# Patient Record
Sex: FEMALE | Race: BLACK OR AFRICAN AMERICAN | NOT HISPANIC OR LATINO | Employment: OTHER | URBAN - METROPOLITAN AREA
[De-identification: names, ages, dates, MRNs, and addresses within clinical notes are randomized per-mention and may not be internally consistent; named-entity substitution may affect disease eponyms.]

---

## 2022-01-05 ENCOUNTER — OFFICE VISIT (OUTPATIENT)
Dept: FAMILY MEDICINE | Facility: CLINIC | Age: 72
End: 2022-01-05
Payer: MEDICARE

## 2022-01-05 VITALS
HEIGHT: 59 IN | BODY MASS INDEX: 23.95 KG/M2 | HEART RATE: 95 BPM | OXYGEN SATURATION: 95 % | WEIGHT: 118.8 LBS | DIASTOLIC BLOOD PRESSURE: 89 MMHG | SYSTOLIC BLOOD PRESSURE: 167 MMHG

## 2022-01-05 DIAGNOSIS — E11.9 TYPE 2 DIABETES MELLITUS WITHOUT COMPLICATION, WITHOUT LONG-TERM CURRENT USE OF INSULIN (H): ICD-10-CM

## 2022-01-05 DIAGNOSIS — M54.2 ANTERIOR NECK PAIN: Primary | ICD-10-CM

## 2022-01-05 DIAGNOSIS — U07.1 ACUTE HYPOXEMIC RESPIRATORY FAILURE DUE TO COVID-19 (H): ICD-10-CM

## 2022-01-05 DIAGNOSIS — I10 ESSENTIAL HYPERTENSION, BENIGN: ICD-10-CM

## 2022-01-05 DIAGNOSIS — J96.01 ACUTE HYPOXEMIC RESPIRATORY FAILURE DUE TO COVID-19 (H): ICD-10-CM

## 2022-01-05 LAB
ANION GAP SERPL CALCULATED.3IONS-SCNC: 10 MMOL/L (ref 5–18)
BUN SERPL-MCNC: 11 MG/DL (ref 8–28)
CALCIUM SERPL-MCNC: 10.4 MG/DL (ref 8.5–10.5)
CHLORIDE BLD-SCNC: 107 MMOL/L (ref 98–107)
CO2 SERPL-SCNC: 23 MMOL/L (ref 22–31)
CREAT SERPL-MCNC: 0.89 MG/DL (ref 0.6–1.1)
GFR SERPL CREATININE-BSD FRML MDRD: 69 ML/MIN/1.73M2
GLUCOSE BLD-MCNC: 177 MG/DL (ref 70–125)
POTASSIUM BLD-SCNC: 4.7 MMOL/L (ref 3.5–5)
SODIUM SERPL-SCNC: 140 MMOL/L (ref 136–145)
TSH SERPL DL<=0.005 MIU/L-ACNC: 0.96 UIU/ML (ref 0.3–5)

## 2022-01-05 PROCEDURE — 84443 ASSAY THYROID STIM HORMONE: CPT | Performed by: FAMILY MEDICINE

## 2022-01-05 PROCEDURE — 80048 BASIC METABOLIC PNL TOTAL CA: CPT | Performed by: FAMILY MEDICINE

## 2022-01-05 PROCEDURE — 36415 COLL VENOUS BLD VENIPUNCTURE: CPT | Performed by: FAMILY MEDICINE

## 2022-01-05 PROCEDURE — 99205 OFFICE O/P NEW HI 60 MIN: CPT | Performed by: FAMILY MEDICINE

## 2022-01-05 RX ORDER — AMLODIPINE BESYLATE 5 MG/1
1 TABLET ORAL DAILY
COMMUNITY
Start: 2021-12-17 | End: 2023-08-09

## 2022-01-05 RX ORDER — BENZONATATE 100 MG/1
100 CAPSULE ORAL 3 TIMES DAILY PRN
COMMUNITY
Start: 2021-12-16 | End: 2022-01-05

## 2022-01-05 ASSESSMENT — MIFFLIN-ST. JEOR: SCORE: 956.56

## 2022-01-05 NOTE — PROGRESS NOTES
Assessment & Plan     Anterior neck pain  Etiology unclear. Does not seem like neck strain considering location and tenderness on exam. Consider lymphadenitis though no obvious lymph node palpated. Possibly thyroid nodule or thyroiditis, though would be very upper pole of left lobe of thyroid. Seems too distal for hyoid fracture but also consider this given recent acute, coughing illness. She is currently on xarelto and unlikely to represent clot, particularly with no findings of congestion. Will evaluate today with TSH and recommend ultrasound.   - TSH with free T4 reflex; Future  - US Head Neck Soft Tissue; Future  - TSH with free T4 reflex    Acute hypoxemic respiratory failure due to COVID-19 (H)  Admitted to [facility] 12/11 through 12/18 and is also been part of their home hospital care program.  Just had a visit yesterday and seems to be doing well.  Believe she has at least 1 more visit through this program.  She otherwise seems to be recovering well from her illness.  No ongoing shortness of breath or significant coughing.  She is on Xarelto for DVT prophylaxis and will be on this through approximately January 15.  She did receive her first dose of COVID-19 vaccine 12/20/2021, congratulated her and will be due for next dose in approximately 2 weeks.  Did recommend influenza vaccine as well but she did not wish to receive this today.    Essential hypertension, benign  Previously had been treated with atenolol and hydrochlorothiazide.  She stopped taking these long time ago.  Currently taking 5 mg amlodipine daily with some improvement in her blood pressure, though elevated today and has been at home per community paramedic checks.  Recommended today that we increase to 10 mg amlodipine though she did not wish to do this at this time.  We discussed doing lab work today as additional medication may be indicated and she agreed with this today.  Recommend follow-up to review again in 2 to 3 weeks.  -  Basic metabolic panel; Future  - Basic metabolic panel    Type 2 diabetes mellitus without complication, without long-term current use of insulin (H)  Recent diagnosis with elevated A1c of 6.6%.  Likely does represent new onset diabetes with historically mildly elevated A1c just above 6%.  She is checking her fasting blood sugars at home currently and they have been at goal between 90 and 120.  Given her age and function, not unreasonable to manage primarily with diet and activity at this time, though did review recommendation for medication management. Will review again with follow-up.     HCM  -Due for colonoscopy (no prior) and mammogram; currently ordered through [facility], though if planning to follow primarily here, may need to reorder  -No recent pap/HPV testing (last seems to have been 2012).     I spent a total of 68 minutes on the day of the visit.   Time spent doing chart review, history and exam, documentation and further activities per the note    Benjamin Rosenstein, MD, MA  [facility]  [facility] [place] Subjective Daphine is a 71 year old who presents for the following health issues     Chief Complaint   Patient presents with     Facial Pain     left side facial pain     Medication Reconciliation     complete -NEEDS ATTENTION     New patient - establishing care     HPI     Admitted to [facility] 12/11/21-12/18/21 due to COVID19 pneumonia  Treated with dexamethasone, ceftriaxone and azithromycin for possible secondary bacterial pneumonia  Discharged to hospital e at home program, on 2L O2 at the time. Was not intubated    Was started on xarelto for DVT prophylaxis for total of 28 days    Also with new diagnosis of diabetes with A1c of 6.6%  Started on 5mg amlodipine due to hypertension\    Continues to be followed by home care team, last visit yesterday 1/4/21    Facial Pain  -Started 10 days ago, only on the left side  -Been the same the  "entire time. Feels pain underneath her ear down to her neck. Unable to say if sharp, dull, or throbbing  -Came on and been the same since  -Has not taken anything for it  -Does not bother her during the day with movement or activity (not currently working)  -Primarily notices it at night, not necessarily when going to bed  -No pain with speaking or opening mouth. No change in hearing, possibly pain within the ear (says pain seem inside pointing at left neck/ear)  -No fevers/chills. No rash    HTN  -Blood pressures with home care have remained elevated 140-160 SBP (yesterdays 160/78)  -No headache, no shortness of breath, no chest pain or pressure  -No LE swelling  -No lightheaded or dizziness on standing  -Taking amlodipine 5mg daily    DM2  -New diagnosis, with A1c of 6.6% (12/11/21) - historically prediabetic range  -States she is just checking blood sugars, between  fasting BGs  -Not taking any medications, did not wish to start any at this time    Started COVID19 vaccine series 12/20/21  Recommended influenza vaccination    Has had mammogram and colonoscopy ordered through [facility]    Review of Systems   Constitutional, HEENT, cardiovascular, pulmonary, GI, , musculoskeletal, neuro, skin, and psych systems are negative, except as otherwise noted.    LMP ~ 20 years ago        Objective    BP (!) 165/81   Pulse 95   Ht 1.486 m (4' 10.5\")   Wt 53.9 kg (118 lb 12.8 oz)   SpO2 95%   BMI 24.41 kg/m    Body mass index is 24.41 kg/m .  Physical Exam     General: Seated comfortably, appears well, NAD  HEENT:  - Head: Atraumatic, normocephalic  - Eyes: Corneas clear, sclera without injection, no discharge, EOMI, PERRLA  - Ears: Canals patent - left canal narrower than the right, minimal cerumen, TMs normal appearance without fluid or bulging; no mastoid tenderness  - Nose: Nares patent, no rhinorrhea, no congestion  - Throat: Oropharynx clear without lesions, tonsils normal, posterior pharynx without " erythema, swelling, or exudate; no trismus  Neck: Exquisite tenderness to palpation left side of neck about 2cm belwo angle of the jaw, anterior to the SCM. No obvious nodule. No submandibular tenderness. No thyromegaly, thyroid nodules, or thyroid tenderness  Lymph: No anterior or posterior cervical nodes; no supraclavicular nodes  CV: RRR, normal S1/S2, no murmurs/rubs/gallops, Radial and DP pulses 2/4   Pulm: Normal WOB, lungs CTAB, no wheezes or crackles, good air movement   GI: Abdomen soft, not tender, not distended, BS normal and active throughout  Neuro: Alert, answering questions appropriately, normal thought processes; strength 5/5 in distal and proximal extremiteis throughout; Normal Gait

## 2022-01-07 NOTE — RESULT ENCOUNTER NOTE
Please call patient with the following    Hello Daphine    Your thyroid test was normal. Your electrolyte levels were also all normal. Your glucose level was elevated and we should review diabetes in more detail at your next visit.     Thank you  Benjamin Rosenstein, MD, MA

## 2022-01-10 ENCOUNTER — HOSPITAL ENCOUNTER (OUTPATIENT)
Dept: ULTRASOUND IMAGING | Facility: HOSPITAL | Age: 72
Discharge: HOME | End: 2022-01-10
Attending: FAMILY MEDICINE | Admitting: FAMILY MEDICINE
Payer: MEDICARE

## 2022-01-10 DIAGNOSIS — M54.2 ANTERIOR NECK PAIN: ICD-10-CM

## 2022-01-10 PROCEDURE — 76536 US EXAM OF HEAD AND NECK: CPT

## 2023-08-09 ENCOUNTER — OFFICE VISIT (OUTPATIENT)
Dept: FAMILY MEDICINE | Facility: CLINIC | Age: 73
End: 2023-08-09
Payer: MEDICARE

## 2023-08-09 VITALS
WEIGHT: 122.4 LBS | DIASTOLIC BLOOD PRESSURE: 106 MMHG | HEIGHT: 58 IN | BODY MASS INDEX: 25.69 KG/M2 | HEART RATE: 77 BPM | SYSTOLIC BLOOD PRESSURE: 199 MMHG

## 2023-08-09 DIAGNOSIS — Z12.11 SCREEN FOR COLON CANCER: ICD-10-CM

## 2023-08-09 DIAGNOSIS — I10 ESSENTIAL HYPERTENSION, BENIGN: ICD-10-CM

## 2023-08-09 DIAGNOSIS — Z11.59 NEED FOR HEPATITIS C SCREENING TEST: ICD-10-CM

## 2023-08-09 DIAGNOSIS — Z11.1 SCREENING EXAMINATION FOR PULMONARY TUBERCULOSIS: Primary | ICD-10-CM

## 2023-08-09 DIAGNOSIS — E11.9 TYPE 2 DIABETES MELLITUS WITHOUT COMPLICATION, WITHOUT LONG-TERM CURRENT USE OF INSULIN (H): ICD-10-CM

## 2023-08-09 LAB
ANION GAP SERPL CALCULATED.3IONS-SCNC: 8 MMOL/L (ref 7–15)
BUN SERPL-MCNC: 17.6 MG/DL (ref 8–23)
CALCIUM SERPL-MCNC: 10 MG/DL (ref 8.8–10.2)
CHLORIDE SERPL-SCNC: 105 MMOL/L (ref 98–107)
CHOLEST SERPL-MCNC: 233 MG/DL
CREAT SERPL-MCNC: 0.99 MG/DL (ref 0.51–0.95)
CREAT UR-MCNC: 82.9 MG/DL
DEPRECATED HCO3 PLAS-SCNC: 27 MMOL/L (ref 22–29)
GFR SERPL CREATININE-BSD FRML MDRD: 60 ML/MIN/1.73M2
GLUCOSE SERPL-MCNC: 111 MG/DL (ref 70–99)
HBA1C MFR BLD: 6 % (ref 0–5.6)
HDLC SERPL-MCNC: 46 MG/DL
LDLC SERPL CALC-MCNC: 165 MG/DL
MICROALBUMIN UR-MCNC: 16 MG/L
MICROALBUMIN/CREAT UR: 19.3 MG/G CR (ref 0–25)
NONHDLC SERPL-MCNC: 187 MG/DL
POTASSIUM SERPL-SCNC: 5.3 MMOL/L (ref 3.4–5.3)
SODIUM SERPL-SCNC: 140 MMOL/L (ref 136–145)
TRIGL SERPL-MCNC: 110 MG/DL

## 2023-08-09 PROCEDURE — 83036 HEMOGLOBIN GLYCOSYLATED A1C: CPT

## 2023-08-09 PROCEDURE — 82043 UR ALBUMIN QUANTITATIVE: CPT

## 2023-08-09 PROCEDURE — 82570 ASSAY OF URINE CREATININE: CPT

## 2023-08-09 PROCEDURE — 99214 OFFICE O/P EST MOD 30 MIN: CPT | Mod: GC

## 2023-08-09 PROCEDURE — 80048 BASIC METABOLIC PNL TOTAL CA: CPT

## 2023-08-09 PROCEDURE — 80061 LIPID PANEL: CPT

## 2023-08-09 PROCEDURE — 86803 HEPATITIS C AB TEST: CPT

## 2023-08-09 PROCEDURE — 86481 TB AG RESPONSE T-CELL SUSP: CPT

## 2023-08-09 PROCEDURE — 36415 COLL VENOUS BLD VENIPUNCTURE: CPT

## 2023-08-09 RX ORDER — AMLODIPINE BESYLATE 5 MG/1
5 TABLET ORAL DAILY
Qty: 90 TABLET | Refills: 3 | Status: SHIPPED | OUTPATIENT
Start: 2023-08-09 | End: 2024-05-07

## 2023-08-09 NOTE — PROGRESS NOTES
Assessment & Plan   Lucia is a 73 year old female with PMH HTN, DM2 who presents today for screening TB test. Denies prior history of TB or positive TB test.   BP elevated today at 199/106. Not on her amlodipine 5mg. Asymptomatic. Will check labs, restart amlodipine, pt to check home BP, and follow up in 2 weeks.     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 Asymptomatic. Denies history of TB or positive TB test.   - Quantiferon-TB Gold Plus    Essential hypertension, benign  Previously on amlodipine 5 mg > 1 year ago. Asymptomatic, but BP elevated to 199/106 today. Will restart amlodipine and have close follow up.   - BASIC METABOLIC PANEL  - amLODIPine (NORVASC) 5 MG tablet  Dispense: 90 tablet; Refill: 3  - Monitor BP at home and bring log to next appointment     Type 2 diabetes mellitus without complication, without long-term current use of insulin (H)  A1c 6.0 one year ago (prediabetic range). Not on any medications. Will recheck labs today.   - Albumin Random Urine Quantitative with Creat Ratio  - Lipid panel reflex to direct LDL Non-fasting  - HEMOGLOBIN A1C    Screen for colon cancer  Negative FIT test 2/4/2022. Due for recheck.   - Fecal colorectal cancer screen FIT    Need for hepatitis C screening test  - Hepatitis C Screen Reflex to HCV RNA Quant and Genotype    Return in about 2 weeks (around 8/23/2023) for Follow up blood pressure.    Discussed with attending, Dr. Curtis.     Carlyn Archuleta DO PGY2  [facility]    Subjective   Lucia is a 73 year old, presenting for the following health issues:  Establish Care and TB test         No data to display                HPI   Lucia is a 73 year old female with PMH HTN, DM2 who presents to the clinic today for TB test. Denies any history of positive TB test or TB. 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 Feels well. Denies symptoms.   BP elevated today  "to 199/106. Patient reports she has been diagnosed with HTN before, but has not seen a provider or taken any medications, including BP medications for some time. Feels well. Has a BP cuff at home, but has not checked home BP.   On chart review, looks like pt was most recently on amlodipine 5 mg (prescribed in 2021). Prior, she was on hydrochlorothiazide. Patient prefers to restart amlodipine 5 mg.       Review of Systems   Constitutional, HEENT, cardiovascular, pulmonary, gi and gu systems are negative, except as otherwise noted.      Objective    BP (!) 199/106   Pulse 77   Ht 1.461 m (4' 9.5\")   Wt 55.5 kg (122 lb 6.4 oz)   BMI 26.03 kg/m    Body mass index is 26.03 kg/m .  Physical Exam   GENERAL: healthy, alert and no distress  RESP: lungs clear to auscultation - no rales, rhonchi or wheezes  CV: regular rate and rhythm, normal S1 S2, no S3 or S4, no murmur, click or rub, no peripheral edema and peripheral pulses strong  MS: no gross musculoskeletal defects noted, no edema      ----- Service Performed and Documented by Resident or Fellow ------              "

## 2023-08-09 NOTE — PROGRESS NOTES
Preceptor Attestation:    I discussed the patient with the resident and evaluated the patient in person. I have verified the content of the note, which accurately reflects my assessment of the patient and the plan of care.   Supervising Physician:  Sukhjinder Curtis DO.

## 2023-08-09 NOTE — LETTER
September 5, 2023      Lucia Kooasley  [address] [place] [address]        Dear ,    We are writing to inform you of your test results.    Your TB result is negative which is good new. You've missed a couple of your schedule appointment. Please call clinic to reschedule appointment to discuss your lab results and BP check.     Resulted Orders   Fecal colorectal cancer screen FIT   Result Value Ref Range    Occult Blood Screen FIT Negative Negative   BASIC METABOLIC PANEL   Result Value Ref Range    Sodium 140 136 - 145 mmol/L    Potassium 5.3 3.4 - 5.3 mmol/L    Chloride 105 98 - 107 mmol/L    Carbon Dioxide (CO2) 27 22 - 29 mmol/L    Anion Gap 8 7 - 15 mmol/L    Urea Nitrogen 17.6 8.0 - 23.0 mg/dL    Creatinine 0.99 (H) 0.51 - 0.95 mg/dL    Calcium 10.0 8.8 - 10.2 mg/dL    Glucose 111 (H) 70 - 99 mg/dL    GFR Estimate 60 (L) >60 mL/min/1.73m2   HEMOGLOBIN A1C   Result Value Ref Range    Hemoglobin A1C 6.0 (H) 0.0 - 5.6 %      Comment:      Normal <5.7%   Prediabetes 5.7-6.4%    Diabetes 6.5% or higher     Note: Adopted from ADA consensus guidelines.   Lipid panel reflex to direct LDL Non-fasting   Result Value Ref Range    Cholesterol 233 (H) <200 mg/dL    Triglycerides 110 <150 mg/dL    Direct Measure HDL 46 (L) >=50 mg/dL    LDL Cholesterol Calculated 165 (H) <=100 mg/dL    Non HDL Cholesterol 187 (H) <130 mg/dL    Narrative    Cholesterol  Desirable:  <200 mg/dL    Triglycerides  Normal:  Less than 150 mg/dL  Borderline High:  150-199 mg/dL  High:  200-499 mg/dL  Very High:  Greater than or equal to 500 mg/dL    Direct Measure HDL  Female:  Greater than or equal to 50 mg/dL   Male:  Greater than or equal to 40 mg/dL    LDL Cholesterol  Desirable:  <100mg/dL  Above Desirable:  100-129 mg/dL   Borderline High:  130-159 mg/dL   High:  160-189 mg/dL   Very High:  >= 190 mg/dL    Non HDL Cholesterol  Desirable:  130 mg/dL  Above Desirable:  130-159 mg/dL  Borderline High:  160-189 mg/dL  High:   190-219 mg/dL  Very High:  Greater than or equal to 220 mg/dL   Hepatitis C Screen Reflex to HCV RNA Quant and Genotype   Result Value Ref Range    Hepatitis C Antibody Nonreactive Nonreactive    Narrative    Assay performance characteristics have not been established for newborns, infants, and children.   Albumin Random Urine Quantitative with Creat Ratio   Result Value Ref Range    Creatinine Urine mg/dL 82.9 mg/dL      Comment:      The reference ranges have not been established in urine creatinine. The results should be integrated into the clinical context for interpretation.    Albumin Urine mg/L 16.0 mg/L      Comment:      The reference ranges have not been established in urine albumin. The results should be integrated into the clinical context for interpretation.    Albumin Urine mg/g Cr 19.30 0.00 - 25.00 mg/g Cr      Comment:      Microalbuminuria is defined as an albumin:creatinine ratio of 17 to 299 for males and 25 to 299 for females. A ratio of albumin:creatinine of 300 or higher is indicative of overt proteinuria.  Due to biologic variability, positive results should be confirmed by a second, first-morning random or 24-hour timed urine specimen. If there is discrepancy, a third specimen is recommended. When 2 out of 3 results are in the microalbuminuria range, this is evidence for incipient nephropathy and warrants increased efforts at glucose control, blood pressure control, and institution of therapy with an angiotensin-converting-enzyme (ACE) inhibitor (if the patient can tolerate it).     Quantiferon TB Gold Plus Grey Tube   Result Value Ref Range    Quantiferon Nil Tube 0.08 IU/mL   Quantiferon TB Gold Plus Green Tube   Result Value Ref Range    Quantiferon TB1 Tube 0.09 IU/mL   Quantiferon TB Gold Plus Yellow Tube   Result Value Ref Range    Quantiferon TB2 Tube 0.08    Quantiferon TB Gold Plus Purple Tube   Result Value Ref Range    Quantiferon Mitogen 10.00 IU/mL   Quantiferon TB Gold Plus    Result Value Ref Range    Quantiferon-TB Gold Plus Negative Negative      Comment:      No interferon gamma response to M.tuberculosis antigens was detected. Infection with M.tuberculosis is unlikely, however a single negative result does not exclude infection. In patients at high risk for infection, a second test should be considered in accordance with the 2017 ATS/IDSA/CDC Clinical Pract  ice Guidelines for Diagnosis of Tuberculosis in Adults and Children     TB1 Ag minus Nil Value 0.01 IU/mL    TB2 Ag minus Nil Value 0.00 IU/mL    Mitogen minus Nil Result 9.92 IU/mL    Nil Result 0.08 IU/mL       If you have any questions or concerns, please call the clinic at the number listed above.       Sincerely,      Carlyn Kimbrough, DO

## 2023-08-10 LAB
GAMMA INTERFERON BACKGROUND BLD IA-ACNC: 0.08 IU/ML
M TB IFN-G BLD-IMP: NEGATIVE
M TB IFN-G CD4+ BCKGRND COR BLD-ACNC: 9.92 IU/ML
MITOGEN IGNF BCKGRD COR BLD-ACNC: 0 IU/ML
MITOGEN IGNF BCKGRD COR BLD-ACNC: 0.01 IU/ML
QUANTIFERON MITOGEN: 10 IU/ML
QUANTIFERON NIL TUBE: 0.08 IU/ML
QUANTIFERON TB1 TUBE: 0.09 IU/ML
QUANTIFERON TB2 TUBE: 0.08

## 2023-08-11 LAB — HCV AB SERPL QL IA: NONREACTIVE

## 2023-08-23 PROCEDURE — 82274 ASSAY TEST FOR BLOOD FECAL: CPT

## 2023-08-25 LAB — HEMOCCULT STL QL IA: NEGATIVE

## 2023-09-05 NOTE — RESULT ENCOUNTER NOTE
Attempt to get a hold of patient. Phone ring, but line gets cut and unable to get through to patient. Result letter generate and sent to patient. Melly Crockett, CMA

## 2024-05-06 NOTE — PROGRESS NOTES
"  Assessment & Plan   Essential hypertension, benign  Patient previously on 5 mg amlodipine.  Ran out about 1 month ago and started noticing worsening headaches at that time.  Unilateral and midline with slight pressure, relieved with Tylenol.  Does not seem to be migrainous at this time.  Blood pressure is well-controlled today.  Seem to only do a single fill of 90 days amlodipine even though she had 3 refills, may not be aware of refills available, or may not be taking daily.  By those numbers, she would have run out more than a few months ago.  Sending in fresh prescription today and we will restart tracking fills.   -refilled amlodipine     Type 2 diabetes mellitus without complication, without long-term current use of insulin (H)  Overdue for some diabetes checks, patient aware that she also needs to check in on her cholesterol as well.  Plan to see her back in 1 month.  Patient previously was told to come back in 2 weeks and then came back almost 6 months later, does not report any barriers to coming into the clinic, so hopefully we will see her soon, if not we may need to send her a reminder call around the 1 month franklin.  - HEMOGLOBIN A1C  - Basic metabolic panel  - Albumin Random Urine Quantitative with Creat Ratio        Diagnosis or treatment significantly limited by social determinants of health - recent job inhibited ability to come into clinic  Ordering of each unique test  Prescription drug management  20 minutes spent by me on the date of the encounter doing chart review, history and exam, documentation and further activities per the note      BMI  Estimated body mass index is 26.5 kg/m  as calculated from the following:    Height as of this encounter: 1.461 m (4' 9.5\").    Weight as of this encounter: 56.5 kg (124 lb 9.6 oz).   Weight management plan: Discussed healthy diet and exercise guidelines      MEDICATIONS:  Continue current medications without change    Return in about 4 weeks (around " "6/4/2024) for Diabetes follow up.    Demetrius Myers is a 73 year old, presenting for the following health issues:  Hypertension (Bp ck and poss change of medications - pt claims the med is not working and causing head aches for a month ) and Forms (Emotional support animal forms )         No data to display              HPI   BP check  Chronically high bp, seen last 8/9/23 with 199/106 bp. Previously on amlodipine, not taking at previous visit. Plan was to follow up in 2 weeks. PCP listed as Dr. Shayne Post at [facility], last seen 1/5/22.     Patient ran out of her amlodipine about 1 month ago. Noted mild central, frontal headaches around that time. No throbbing component, mild to moderate intensity and improved with Tylenol. Has a home BP cuff, but has not been checking pressures. Wants to switch to our clinic due to more convenient location.     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 Knows she needs to follow up on diabetes and cholesterol and open to do that at next visit.     Medical note for emotional support dog. Dog helps patient get regular exercise to help with her hypertension and diabetes and provides social and emotional support. Needs letter to allow in her apartment.       Review of Systems  Constitutional, HEENT, cardiovascular, pulmonary, gi and gu systems are negative, except as otherwise noted.      Objective    /81   Pulse 96   Temp 98.5  F (36.9  C) (Oral)   Resp 16   Ht 1.461 m (4' 9.5\")   Wt 56.5 kg (124 lb 9.6 oz)   SpO2 95%   BMI 26.50 kg/m    Body mass index is 26.5 kg/m .  Physical Exam   GENERAL: alert and no distress  RESP: lungs clear to auscultation - no rales, rhonchi or wheezes  CV: regular rate and rhythm, normal S1 S2, no S3 or S4, no murmur, click or rub, no peripheral edema  MS: no gross musculoskeletal defects noted, no edema  PSYCH: mentation appears normal, affect " normal/bright    Results for orders placed or performed in visit on 05/07/24 (from the past 24 hour(s))   HEMOGLOBIN A1C   Result Value Ref Range    Hemoglobin A1C 6.0 (H) 0.0 - 5.6 %           Signed Electronically by: Shandra Werner MD

## 2024-05-07 ENCOUNTER — OFFICE VISIT (OUTPATIENT)
Dept: FAMILY MEDICINE | Facility: CLINIC | Age: 74
End: 2024-05-07
Payer: MEDICARE

## 2024-05-07 VITALS
TEMPERATURE: 98.5 F | BODY MASS INDEX: 26.16 KG/M2 | HEIGHT: 58 IN | SYSTOLIC BLOOD PRESSURE: 129 MMHG | WEIGHT: 124.6 LBS | DIASTOLIC BLOOD PRESSURE: 81 MMHG | HEART RATE: 96 BPM | OXYGEN SATURATION: 95 % | RESPIRATION RATE: 16 BRPM

## 2024-05-07 DIAGNOSIS — I10 ESSENTIAL HYPERTENSION, BENIGN: ICD-10-CM

## 2024-05-07 DIAGNOSIS — E11.9 TYPE 2 DIABETES MELLITUS WITHOUT COMPLICATION, WITHOUT LONG-TERM CURRENT USE OF INSULIN (H): Primary | ICD-10-CM

## 2024-05-07 LAB
CREAT UR-MCNC: 167 MG/DL
HBA1C MFR BLD: 6 % (ref 0–5.6)
MICROALBUMIN UR-MCNC: 151 MG/L
MICROALBUMIN/CREAT UR: 90.42 MG/G CR (ref 0–25)

## 2024-05-07 PROCEDURE — 99214 OFFICE O/P EST MOD 30 MIN: CPT | Mod: GC

## 2024-05-07 PROCEDURE — 82043 UR ALBUMIN QUANTITATIVE: CPT

## 2024-05-07 PROCEDURE — 82570 ASSAY OF URINE CREATININE: CPT

## 2024-05-07 PROCEDURE — 80048 BASIC METABOLIC PNL TOTAL CA: CPT

## 2024-05-07 PROCEDURE — 83036 HEMOGLOBIN GLYCOSYLATED A1C: CPT

## 2024-05-07 PROCEDURE — 36415 COLL VENOUS BLD VENIPUNCTURE: CPT

## 2024-05-07 RX ORDER — RESPIRATORY SYNCYTIAL VIRUS VACCINE 120MCG/0.5
0.5 KIT INTRAMUSCULAR ONCE
Qty: 1 EACH | Refills: 0 | Status: CANCELLED | OUTPATIENT
Start: 2024-05-07 | End: 2024-05-07

## 2024-05-07 RX ORDER — AMLODIPINE BESYLATE 5 MG/1
5 TABLET ORAL DAILY
Qty: 90 TABLET | Refills: 1 | Status: SHIPPED | OUTPATIENT
Start: 2024-05-07

## 2024-05-07 NOTE — LETTER
May 7, 2024      Lucia Escobar  [address]  [place] [address]        To whom it may concern:    Lucia Escobar has been under our care for health management.  She struggles with various health conditions that are benefited by regular exercise.  Having a dog in the apartment allows the patient to be consistent with her daily exercise regimen, which has helped her overall health, and continues to provide support to her medical conditions.  In addition to that, this dog provides essential socialization and emotional support to help stave off loneliness misses a leading cause of health comorbidities.    At this time, it is my medical recommendation that Lucia be allowed to have this dog in her apartment as an emotional support animal.  If you have further questions about this, please feel free to reach out to the clinic at the number enclosed above.    Sincerely,    Shandra Werner MD

## 2024-05-07 NOTE — PATIENT INSTRUCTIONS
We will be doing some lab checks today to check in on your diabetes.  Lets have you come back in the next month to go over that as well as various other health maintenance things we can do to keep you as healthy and active as possible.    If you have any trouble with the emotional support dog form, please feel free to contact the clinic and we will help out as best we are able.

## 2024-05-07 NOTE — PROGRESS NOTES
Preceptor Attestation:    I discussed the patient with the resident & medical student and evaluated the patient in person. I have verified the content of the note, which accurately reflects my assessment of the patient and the plan of care.   Supervising Physician:  Arjun Vasquez MD.

## 2024-05-08 LAB
ANION GAP SERPL CALCULATED.3IONS-SCNC: 11 MMOL/L (ref 7–15)
BUN SERPL-MCNC: 12.3 MG/DL (ref 8–23)
CALCIUM SERPL-MCNC: 9.8 MG/DL (ref 8.8–10.2)
CHLORIDE SERPL-SCNC: 103 MMOL/L (ref 98–107)
CREAT SERPL-MCNC: 1.01 MG/DL (ref 0.51–0.95)
DEPRECATED HCO3 PLAS-SCNC: 26 MMOL/L (ref 22–29)
EGFRCR SERPLBLD CKD-EPI 2021: 59 ML/MIN/1.73M2
GLUCOSE SERPL-MCNC: 119 MG/DL (ref 70–99)
POTASSIUM SERPL-SCNC: 4.4 MMOL/L (ref 3.4–5.3)
SODIUM SERPL-SCNC: 140 MMOL/L (ref 135–145)

## 2024-07-18 ENCOUNTER — TELEPHONE (OUTPATIENT)
Dept: FAMILY MEDICINE | Facility: CLINIC | Age: 74
End: 2024-07-18
Payer: MEDICARE

## 2024-07-18 ENCOUNTER — DOCUMENTATION ONLY (OUTPATIENT)
Dept: FAMILY MEDICINE | Facility: CLINIC | Age: 74
End: 2024-07-18
Payer: MEDICARE

## 2024-07-18 NOTE — TELEPHONE ENCOUNTER
Per provider has not seem patient and are not comfortable filling out LORE form. Ask to have patient schedule for appt to complete form.    Called patient home number [phone], person who  phone state that it is not patient's number then hang up. Also call patient's mobile number [phone], which it did not go through. Will have to try again later. Melly Crockett, CMA

## 2024-07-18 NOTE — PROGRESS NOTES
To be completed in Nursing note:    Please reference list for forms that require a visit for completion.  Please remind patients that providers are given 3-5 business days to complete and return forms.      Form type: Reasonable Accomodation: LORE Dog    Date form received: 7/18/24    Date form completed by Physician: 7/18/24    How was form returned to patient (mailed, faxed, or at  for patient to ): Fax form to [phone]    Date form mailed/faxed/left at  for patient and sent to HIM for scanning:        Per Dr. Dickinson, patient needs to be seem prior to form can be fill out. This writer attempt to get a hold of patient twice unsuccessfully. Documentation fax back to fax# [phone] to have patient call clinic to schedule appt to have form complete. Form will be send to MR to scan into patient's chart for future completion.       Once form is left for patient, faxed, or mailed PCS will then close the documentation only encounter.

## 2024-07-31 ENCOUNTER — TELEPHONE (OUTPATIENT)
Dept: FAMILY MEDICINE | Facility: CLINIC | Age: 74
End: 2024-07-31
Payer: MEDICARE

## 2024-07-31 NOTE — TELEPHONE ENCOUNTER
Another attempt make to get a hold of patient. Call unable to get through to patient, per message to call back another time. Melly Crockett, RASHID

## 2025-06-20 PROBLEM — Z86.16 HISTORY OF COVID-19: Status: ACTIVE | Noted: 2021-12-11

## 2025-06-20 PROBLEM — E78.5 HYPERLIPIDEMIA: Status: ACTIVE | Noted: 2022-02-11

## 2025-07-09 ENCOUNTER — OFFICE VISIT (OUTPATIENT)
Dept: FAMILY MEDICINE | Facility: CLINIC | Age: 75
End: 2025-07-09
Payer: COMMERCIAL

## 2025-07-09 VITALS
TEMPERATURE: 97.6 F | OXYGEN SATURATION: 97 % | WEIGHT: 114.8 LBS | DIASTOLIC BLOOD PRESSURE: 94 MMHG | SYSTOLIC BLOOD PRESSURE: 168 MMHG | RESPIRATION RATE: 16 BRPM | HEART RATE: 82 BPM | BODY MASS INDEX: 24.77 KG/M2 | HEIGHT: 57 IN

## 2025-07-09 DIAGNOSIS — E78.5 DYSLIPIDEMIA, GOAL LDL BELOW 70: ICD-10-CM

## 2025-07-09 DIAGNOSIS — I63.9 CEREBROVASCULAR ACCIDENT (CVA), UNSPECIFIED MECHANISM (H): ICD-10-CM

## 2025-07-09 DIAGNOSIS — E11.9 TYPE 2 DIABETES MELLITUS WITHOUT COMPLICATION, WITHOUT LONG-TERM CURRENT USE OF INSULIN (H): ICD-10-CM

## 2025-07-09 DIAGNOSIS — Z09 HOSPITAL DISCHARGE FOLLOW-UP: Primary | ICD-10-CM

## 2025-07-09 DIAGNOSIS — G44.209 TENSION HEADACHE: ICD-10-CM

## 2025-07-09 DIAGNOSIS — I10 ESSENTIAL HYPERTENSION, BENIGN: ICD-10-CM

## 2025-07-09 PROCEDURE — 99214 OFFICE O/P EST MOD 30 MIN: CPT | Mod: GC

## 2025-07-09 RX ORDER — EZETIMIBE 10 MG/1
10 TABLET ORAL DAILY
Qty: 90 TABLET | Refills: 3 | Status: SHIPPED | OUTPATIENT
Start: 2025-07-09

## 2025-07-09 RX ORDER — LOSARTAN POTASSIUM 25 MG/1
25 TABLET ORAL DAILY
Qty: 90 TABLET | Refills: 0 | Status: SHIPPED | OUTPATIENT
Start: 2025-07-09

## 2025-07-09 RX ORDER — CLOPIDOGREL BISULFATE 75 MG/1
75 TABLET ORAL
COMMUNITY
Start: 2025-06-22 | End: 2025-09-19

## 2025-07-09 RX ORDER — EZETIMIBE 10 MG/1
10 TABLET ORAL DAILY
COMMUNITY
Start: 2025-06-22 | End: 2026-06-22

## 2025-07-09 RX ORDER — ASPIRIN 81 MG/1
81 TABLET ORAL DAILY
Qty: 90 TABLET | Refills: 11 | Status: SHIPPED | OUTPATIENT
Start: 2025-07-09

## 2025-07-09 RX ORDER — ACETAMINOPHEN 500 MG
500-1000 TABLET ORAL EVERY 6 HOURS PRN
Qty: 90 TABLET | Refills: 0 | Status: SHIPPED | OUTPATIENT
Start: 2025-07-09

## 2025-07-09 RX ORDER — LOSARTAN POTASSIUM 25 MG/1
25 TABLET ORAL DAILY
COMMUNITY
Start: 2025-06-22 | End: 2026-06-22

## 2025-07-09 RX ORDER — ATORVASTATIN CALCIUM 80 MG/1
80 TABLET, FILM COATED ORAL DAILY
Qty: 90 TABLET | Refills: 3 | Status: SHIPPED | OUTPATIENT
Start: 2025-07-09

## 2025-07-09 RX ORDER — ASPIRIN 81 MG/1
81 TABLET, CHEWABLE ORAL
COMMUNITY
Start: 2025-06-22

## 2025-07-09 RX ORDER — ATORVASTATIN CALCIUM 80 MG/1
80 TABLET, FILM COATED ORAL EVERY EVENING
COMMUNITY
Start: 2025-06-22 | End: 2026-06-22

## 2025-07-09 RX ORDER — AMLODIPINE BESYLATE 10 MG/1
10 TABLET ORAL DAILY
Qty: 90 TABLET | Refills: 3 | Status: SHIPPED | OUTPATIENT
Start: 2025-07-09

## 2025-07-09 NOTE — PROGRESS NOTES
Assessment & Plan     Hospital discharge follow-up  ***  - aspirin 81 MG EC tablet  Dispense: 90 tablet; Refill: 11    Cerebrovascular accident (CVA), unspecified mechanism (H)  ***  - aspirin 81 MG EC tablet  Dispense: 90 tablet; Refill: 11    Essential hypertension, benign  ***  - amLODIPine (NORVASC) 10 MG tablet  Dispense: 90 tablet; Refill: 3  - losartan (COZAAR) 25 MG tablet  Dispense: 90 tablet; Refill: 0    Type 2 diabetes mellitus without complication, without long-term current use of insulin (H)  ***  - metFORMIN (GLUCOPHAGE) 1000 MG tablet  Dispense: 90 tablet; Refill: 3    Dyslipidemia, goal LDL below 70  ***  - atorvastatin (LIPITOR) 80 MG tablet  Dispense: 90 tablet; Refill: 3  - ezetimibe (ZETIA) 10 MG tablet  Dispense: 90 tablet; Refill: 3    Tension headache  ***  - acetaminophen (TYLENOL) 500 MG tablet  Dispense: 90 tablet; Refill: 0    MED REC REQUIRED{TIP  Click the link below to document or use med rec list, use list to pull in response :291290}  Post Medication Reconciliation Status:  Discharge medications reconciled, continue medications without change          Subjective   Lucia is a 74 year old, presenting for the following health issues:  Hospital F/U (Stroke) and Headache (Has been 2x days)        7/9/2025    10:48 AM   Additional Questions   Roomed by Adolph BANGURA   Accompanied by Self     HPI      74-year-old female with a listed medical history of hypertension, dyslipidemia, type 2 diabetes was seen in clinic 6/20 and c/o severe headache and had BP >200s. Sent to ED. Admitted for acute CVA seen on brain MRI.   Hospital Follow-up Visit:    Hospital/Nursing Home/IP Rehab Facility: [facility]  Date of Admission: 6/20/25  Date of Discharge: 6/22/25  Reason(s) for Admission:   Primary:  # Acute-subacute CVA of left central ashish     Secondary:   #Scattered basal ganglia lacunar infarcts, age indeterminate  # Right internal carotid artery stenosis  # Hyperlipidemia   # R sided weakness  #  Hypertensive emergency  # Pulmonary edema  # Type 2 diabetes  # HTN  Do you have any other stressors you would like to discuss with your provider? { :345782}    Problems taking medications regularly:  {:947068}  Medication changes since discharge:   Aspirin 81 mg lifelong   Plavix 75 mg x 90 d   Atorvastatin 80 mg   Zetia 10 mg   Losartan   Metformin 500 mg BID   Discharged home with outpatient OT and SLP   Problems adhering to non-medication therapy:  {:940728}    Summary of hospitalization:  CareEverywhere information obtained and reviewed  Diagnostic Tests/Treatments reviewed.  Follow up needed: neurology   Other Healthcare Providers Involved in Patient s Care:         Specialist appointment - OT, SLP and neuro  Update since discharge: {:355207} {TIP  Include information from family/caregivers, SNF, Care Coordination :580220}        Plan of care communicated with patient     {Reference  Coding guidelines- Moderate Complexity F2F/Video within 7 - 14 days of discharge 4214049, High Complexity F2F/Video within 7 days 1291207 or tetzmd94 days [id number] :795129}            Review of Systems  Constitutional, HEENT, cardiovascular, pulmonary, gi and gu systems are negative, except as otherwise noted.      Objective    BP (!) 168/94   Pulse 82   Temp 97.6  F (36.4  C) (Tympanic)   Resp 16   Wt 52.1 kg (114 lb 12.8 oz)   SpO2 97%   BMI 24.84 kg/m    Body mass index is 24.84 kg/m .  Physical Exam   {Exam List (Optional):192510}    {Diagnostic Test Results (Optional):441300}        Signed Electronically by: Hemant Tilley MD  {Email feedback regarding this note to [email or web address]   :964084}   "Systems  Constitutional, HEENT, cardiovascular, pulmonary, gi and gu systems are negative, except as otherwise noted.      Objective    BP (!) 168/94   Pulse 82   Temp 97.6  F (36.4  C) (Tympanic)   Resp 16   Ht 1.448 m (4' 9\")   Wt 52.1 kg (114 lb 12.8 oz)   SpO2 97%   BMI 24.84 kg/m    Body mass index is 24.84 kg/m .  Physical Exam   GENERAL: alert and no distress  EYES: Eyes grossly normal to inspection, PERRL and conjunctivae and sclerae normal  RESP: lungs clear to auscultation - no rales, rhonchi or wheezes  CV: regular rate and rhythm, normal S1 S2, no S3 or S4, no murmur, click or rub, no peripheral edema  ABDOMEN: soft, nontender, no hepatosplenomegaly, no masses and bowel sounds normal  MS: no gross musculoskeletal defects noted, no edema  NEURO: Normal strength and tone, mentation intact and speech normal. CN 2-12 intact. Speech clear.   PSYCH: mentation appears normal, affect normal/bright            Signed Electronically by: Hemant Tilley MD    "

## 2025-07-09 NOTE — PROGRESS NOTES
Preceptor Attestation:    I discussed the patient with the resident and evaluated the patient in person. I have verified the content of the note, which accurately reflects my assessment of the patient and the plan of care.   Supervising Physician:  Ruma Gamez MD

## 2025-07-28 ENCOUNTER — OFFICE VISIT (OUTPATIENT)
Dept: FAMILY MEDICINE | Facility: CLINIC | Age: 75
End: 2025-07-28
Payer: COMMERCIAL

## 2025-07-28 VITALS
OXYGEN SATURATION: 98 % | TEMPERATURE: 97.9 F | RESPIRATION RATE: 16 BRPM | HEIGHT: 57 IN | SYSTOLIC BLOOD PRESSURE: 152 MMHG | WEIGHT: 110 LBS | BODY MASS INDEX: 23.73 KG/M2 | HEART RATE: 85 BPM | DIASTOLIC BLOOD PRESSURE: 83 MMHG

## 2025-07-28 DIAGNOSIS — I63.9 CEREBROVASCULAR ACCIDENT (CVA), UNSPECIFIED MECHANISM (H): Primary | ICD-10-CM

## 2025-07-28 DIAGNOSIS — K21.9 GASTROESOPHAGEAL REFLUX DISEASE WITHOUT ESOPHAGITIS: ICD-10-CM

## 2025-07-28 DIAGNOSIS — I10 ESSENTIAL HYPERTENSION, BENIGN: ICD-10-CM

## 2025-07-28 RX ORDER — FAMOTIDINE 20 MG/1
20 TABLET, FILM COATED ORAL 2 TIMES DAILY
Qty: 90 TABLET | Refills: 3 | Status: SHIPPED | OUTPATIENT
Start: 2025-07-28

## 2025-07-28 RX ORDER — LOSARTAN POTASSIUM 50 MG/1
50 TABLET ORAL DAILY
Qty: 90 TABLET | Refills: 0 | Status: SHIPPED | OUTPATIENT
Start: 2025-07-28

## 2025-07-28 NOTE — PROGRESS NOTES
"  Assessment & Plan     Essential hypertension, benign  Cerebrovascular accident (CVA), unspecified mechanism (H)  74 y/o F hypertension, dyslipidemia, type 2 diabetes , recent CVA (L central pontine stroke on 6/20) here for follow up of BP. Not at goal today in office. Goal < 130/80. 152/83 in office. We discussed increasing her losartan from 25 to 50 mg. Amlodipine is at max dose. Patient in agreement. Follow up in 4 weeks   Increase ARB to 50 mg.   - losartan (COZAAR) 50 MG tablet  Dispense: 90 tablet; Refill: 0  -continue amlodipine 10 mg daily  -continue to log home BP  -DASH diet        Gastroesophageal reflux disease without esophagitis  Reflux associated with some pills such as aspirin. No hx GI bleed or known ulcer. Will try pepcid to aid in her medication compliance. Advised she resume daily baby aspirin for DAPT x 90 days then baby aspirin lifelong.   - famotidine (PEPCID) 20 MG tablet  Dispense: 90 tablet; Refill: 3          Subjective   Lucia is a 75 year old, presenting for the following health issues:  Follow Up (Stroke. Pt said she is doing better. )        7/9/2025    10:48 AM   Additional Questions   Roomed by Adolph BANGURA   Accompanied by Self     HPI      74 y/o F hypertension, dyslipidemia, type 2 diabetes , recent CVA (L central pontine stroke on 6/20) here for follow up of BP. She reports she has been taking BP at home and not at goal. 150s. No symptoms. Denies HA, CP, SOB, new weakness, dysarthria, blurred vision. Has not been taking her recommended baby aspirin because it caused upset stomach. She has been taking a full aspirin every 2 days but no provider told her to do this. She says its been working.     On amlodipine 10 mg, losartan 25 mg     Review of Systems  Constitutional, HEENT, cardiovascular, pulmonary, gi and gu systems are negative, except as otherwise noted.      Objective    BP (!) 152/83   Pulse 85   Temp 97.9  F (36.6  C) (Tympanic)   Resp 16   Ht 1.448 m (4' 9\")   Wt 49.9 " kg (110 lb)   SpO2 98%   BMI 23.80 kg/m    Body mass index is 23.8 kg/m .  Physical Exam   GENERAL: alert and no distress  RESP: lungs clear to auscultation - no rales, rhonchi or wheezes  CV: regular rate and rhythm, normal S1 S2, no S3 or S4, no murmur, click or rub, no peripheral edema  MS: no gross musculoskeletal defects noted, no edema  SKIN: no suspicious lesions or rashes  Neuro: nonfocal. CN 2-12 intact.           Signed Electronically by: Hemant Tilley MD

## 2025-07-28 NOTE — PROGRESS NOTES
"Preceptor attestation:  Vital signs reviewed: BP (!) 172/78   Pulse 85   Temp 97.9  F (36.6  C) (Tympanic)   Resp 16   Ht 1.448 m (4' 9\")   Wt 49.9 kg (110 lb)   SpO2 98%   BMI 23.80 kg/m      Patient seen, evaluated, and discussed with the resident.  I verified the content of the note, which accurately reflects my assessment of the patient and the plan of care.    Supervising physician: Sveta Prieto MD  [facility]    "

## 2025-09-04 ENCOUNTER — TELEPHONE (OUTPATIENT)
Dept: FAMILY MEDICINE | Facility: CLINIC | Age: 75
End: 2025-09-04
Payer: COMMERCIAL